# Patient Record
Sex: FEMALE | Race: WHITE | Employment: FULL TIME | URBAN - METROPOLITAN AREA
[De-identification: names, ages, dates, MRNs, and addresses within clinical notes are randomized per-mention and may not be internally consistent; named-entity substitution may affect disease eponyms.]

---

## 2017-06-28 ENCOUNTER — OFFICE VISIT (OUTPATIENT)
Dept: OBGYN | Facility: CLINIC | Age: 22
End: 2017-06-28
Payer: COMMERCIAL

## 2017-06-28 VITALS
DIASTOLIC BLOOD PRESSURE: 72 MMHG | SYSTOLIC BLOOD PRESSURE: 122 MMHG | HEIGHT: 66 IN | WEIGHT: 142 LBS | BODY MASS INDEX: 22.82 KG/M2

## 2017-06-28 DIAGNOSIS — Z30.41 USES ORAL CONTRACEPTION: ICD-10-CM

## 2017-06-28 DIAGNOSIS — Z11.3 SCREEN FOR STD (SEXUALLY TRANSMITTED DISEASE): ICD-10-CM

## 2017-06-28 DIAGNOSIS — F41.9 ANXIETY: ICD-10-CM

## 2017-06-28 DIAGNOSIS — Z11.8 SCREENING FOR CHLAMYDIAL DISEASE: ICD-10-CM

## 2017-06-28 DIAGNOSIS — Z01.419 ENCOUNTER FOR GYNECOLOGICAL EXAMINATION WITHOUT ABNORMAL FINDING: Primary | ICD-10-CM

## 2017-06-28 PROCEDURE — 87591 N.GONORRHOEAE DNA AMP PROB: CPT | Performed by: OBSTETRICS & GYNECOLOGY

## 2017-06-28 PROCEDURE — 99395 PREV VISIT EST AGE 18-39: CPT | Performed by: OBSTETRICS & GYNECOLOGY

## 2017-06-28 PROCEDURE — G0145 SCR C/V CYTO,THINLAYER,RESCR: HCPCS | Performed by: OBSTETRICS & GYNECOLOGY

## 2017-06-28 PROCEDURE — 87491 CHLMYD TRACH DNA AMP PROBE: CPT | Performed by: OBSTETRICS & GYNECOLOGY

## 2017-06-28 ASSESSMENT — ANXIETY QUESTIONNAIRES
1. FEELING NERVOUS, ANXIOUS, OR ON EDGE: SEVERAL DAYS
3. WORRYING TOO MUCH ABOUT DIFFERENT THINGS: MORE THAN HALF THE DAYS
2. NOT BEING ABLE TO STOP OR CONTROL WORRYING: NOT AT ALL
5. BEING SO RESTLESS THAT IT IS HARD TO SIT STILL: NOT AT ALL
GAD7 TOTAL SCORE: 4
IF YOU CHECKED OFF ANY PROBLEMS ON THIS QUESTIONNAIRE, HOW DIFFICULT HAVE THESE PROBLEMS MADE IT FOR YOU TO DO YOUR WORK, TAKE CARE OF THINGS AT HOME, OR GET ALONG WITH OTHER PEOPLE: SOMEWHAT DIFFICULT
6. BECOMING EASILY ANNOYED OR IRRITABLE: SEVERAL DAYS
7. FEELING AFRAID AS IF SOMETHING AWFUL MIGHT HAPPEN: NOT AT ALL

## 2017-06-28 ASSESSMENT — PATIENT HEALTH QUESTIONNAIRE - PHQ9: 5. POOR APPETITE OR OVEREATING: NOT AT ALL

## 2017-06-28 NOTE — MR AVS SNAPSHOT
"              After Visit Summary   6/28/2017    Elena Meehan    MRN: [id number]           Patient Information     Date Of Birth          1995        Visit Information        Provider Department      6/28/2017 4:00 PM Ewa Rubin MD [facility]        Today's Diagnoses     Encounter for gynecological examination without abnormal finding    -  1    Uses oral contraception        Anxiety        Screen for STD (sexually transmitted disease)        Screening for chlamydial disease           Follow-ups after your visit        Who to contact     If you have questions or need follow up information about today's clinic visit or your schedule please contact [facility] directly at [phone].  Normal or non-critical lab and imaging results will be communicated to you by MyChart, letter or phone within 4 business days after the clinic has received the results. If you do not hear from us within 7 days, please contact the clinic through MyChart or phone. If you have a critical or abnormal lab result, we will notify you by phone as soon as possible.  Submit refill requests through [facility] or call your pharmacy and they will forward the refill request to us. Please allow 3 business days for your refill to be completed.          Additional Information About Your Visit        MyChart Information     [facility] lets you send messages to your doctor, view your test results, renew your prescriptions, schedule appointments and more. To sign up, go to [email or web address] . Click on \"Log in\" on the left side of the screen, which will take you to the Welcome page. Then click on \"Sign up Now\" on the right side of the page.     You will be asked to enter the access code listed below, as well as some personal information. Please follow the directions to create your username and password.     Your access code is: [email or web address]  Expires: 9/28/2017  8:23 AM     Your access code will " " in 90 days. If you need help or a new code, please call your [place] clinic or [phone].        Care EveryWhere ID     This is your Care EveryWhere ID. This could be used by other organizations to access your [place] medical records  [id number]        Your Vitals Were     Height Last Period BMI (Body Mass Index)             5' 5.5\" (1.664 m) 2017 23.27 kg/m2          Blood Pressure from Last 3 Encounters:   17 122/72   16 112/70   01/21/15 110/76    Weight from Last 3 Encounters:   17 142 lb (64.4 kg)   16 140 lb (63.5 kg)   01/21/15 138 lb (62.6 kg) (69 %)*     * Growth percentiles are based on [facility] 2-20 Years data.              We Performed the Following     Chlamydia trachomatis PCR     Neisseria gonorrhoeae PCR     Pap imaged thin layer screen only - recommended age 21 - 24 years          Today's Medication Changes          These changes are accurate as of: 17 11:59 PM.  If you have any questions, ask your nurse or doctor.               These medicines have changed or have updated prescriptions.        Dose/Directions    norgestrel-ethinyl estradiol 0.3-30 MG-MCG per tablet   Commonly known as:  LOW-OGESTREL   This may have changed:  See the new instructions.   Used for:  Uses oral contraception   Changed by:  Ewa Rubin MD        Dose:  1 tablet   Take 1 tablet by mouth daily   Quantity:  84 tablet   Refills:  4            Where to get your medicines      These medications were sent to [id number] [address] [address] [place] [address]  [address] [address] [address]     Phone:  [phone]     norgestrel-ethinyl estradiol 0.3-30 MG-MCG per tablet                Primary Care Provider Office Phone # Fax #    Tere Hester PA-C [phone]       [facility] [address] [facility] [address]  [facility] [address]        Equal Access to Services     CROW DUCKWORTH AH: Hadii will Mahmood, gabyda brittany, qaybta zaheer " marciano sneedliudmilasheldon la'aachelsey ah. Shilpa [facility] [phone].    ATENCIÓN: Si habla abhishek, tiene a hernandez disposición servicios gratuitos de asistencia lingüística. Lisy al [phone].    We comply with applicable federal civil rights laws and [place] laws. We do not discriminate on the basis of race, color, national origin, age, disability sex, sexual orientation or gender identity.            Thank you!     Thank you for choosing [facility] [place]  for your care. Our goal is always to provide you with excellent care. Hearing back from our patients is one way we can continue to improve our services. Please take a few minutes to complete the written survey that you may receive in the mail after your visit with us. Thank you!             Your Updated Medication List - Protect others around you: Learn how to safely use, store and throw away your medicines at www.disposemymeds.org.          This list is accurate as of: 6/28/17 11:59 PM.  Always use your most recent med list.                   Brand Name Dispense Instructions for use Diagnosis    norgestrel-ethinyl estradiol 0.3-30 MG-MCG per tablet    LOW-OGESTREL    84 tablet    Take 1 tablet by mouth daily    Uses oral contraception

## 2017-06-28 NOTE — LETTER
July 11, 2017      Elena Meehan  [address]  [place] [address]    Dear ,      I am happy to inform you that your recent cervical cancer screening test (PAP smear) was normal.      Preventative screenings such as this help to ensure your health for years to come. You should repeat a pap smear in 3 years, unless otherwise directed.      You will still need to return to the clinic every year for your annual exam and other preventive tests.     Please contact the clinic at [phone] if you have further questions.       Sincerely,      Ewa Rubin MD/[facility]

## 2017-06-28 NOTE — PROGRESS NOTES
Elena is a 21 year old  female who presents for annual exam.     Besides routine health maintenance, she has no other health concerns today .    HPI:  Patient used to see Tere so first time seeing me today.  Patient is healthy and active. Exercises and tries to eat healthy. Student at [employer]. Will be in her last year next year. Is a supply chain major.  Studied abroad in [place] early this year. Broke up with her BF of three years for a couple months and now back together. Both were s.a with other people during that time. She was s.a with 2 people, not sure about him. Has had GC/C done in the past but never full STD testing. Has a needle phobia and so really doesn't want to do that. Would like to have her cholesterol and thyroid tested at some point so maybe will try to plan a morning appointment and get all of the testing done together and ask her mom to drive her and help her through it.  ocps are working great. No issues, regular and light periods.  Asked about orgasm b/c is only able to do so if on top. Wondered if that is related to a uterus tilt issue  Patient has been struggling with anxiety. Has always been anxious and is sure that tests and school and BF issues earlier in the year definitely made it worse. Feels like she'd be open to trying medicine b/c knows it helps friends that take it. Her parents want her to hold off and just try yoga or some other stress relief.  The patient does not have a primary care provider.      GYNECOLOGIC HISTORY:    Patient's last menstrual period was 2017.  Her current contraception method is: oral contraceptives.  She  reports that she has never smoked. She does not have any smokeless tobacco history on file.  Patient is sexually active.  STD testing offered?  Accepted    Last PHQ-9 score on record =   PHQ-9 SCORE 2017   Total Score 3     Last GAD7 score on record =   LARRY-7 SCORE 2017   Total Score 4     Alcohol Score = 3    HEALTH  "MAINTENANCE:  Cholesterol: no previous history  Last Mammo: N/A, Result: not applicable  Pap: due for first pap today  Colonoscopy:  N/A, Result: not applicable  Dexa:  N/A  Health maintenance updated:  yes    HISTORY:  Obstetric History       T0      L0     SAB0   TAB0   Ectopic0   Multiple0   Live Births0           Patient Active Problem List   Diagnosis     Uses oral contraception     Anxiety     Past Surgical History:   Procedure Laterality Date     NO HISTORY OF SURGERY        Social History   Substance Use Topics     Smoking status: Never Smoker     Smokeless tobacco: Not on file     Alcohol use Not on file      Problem (# of Occurrences) Relation (Name,Age of Onset)    Breast Cancer (2) Maternal Grandmother, Paternal Grandmother    Coronary Artery Disease (1) Maternal Grandfather    DIABETES (1) Paternal Grandmother    Hyperlipidemia (6) Maternal Grandmother, Paternal Grandmother, Father, Brother, Sister, Paternal Grandfather            Current Outpatient Prescriptions   Medication Sig     norgestrel-ethinyl estradiol (LOW-OGESTREL) 0.3-30 MG-MCG per tablet Take 1 tablet by mouth daily     No current facility-administered medications for this visit.      Allergies   Allergen Reactions     Zithromax [Azithromycin]        Past medical, surgical, social and family histories were reviewed and updated in EPIC.    ROS:   12 point review of systems negative other than symptoms noted below.  Constitutional: Fatigue  Head: Nasal Congestion  Skin: Acne  Psychiatric: Awad    EXAM:  /72  Ht 5' 5.5\" (1.664 m)  Wt 142 lb (64.4 kg)  LMP 2017  BMI 23.27 kg/m2   BMI: Body mass index is 23.27 kg/(m^2).    PHYSICAL EXAM:  Constitutional:  Appearance: Well nourished, well developed, alert, in no acute distress  Neck:  Lymph Nodes:  No lymphadenopathy present    Thyroid:  Gland size normal, nontender, no nodules or masses present  on palpation  Chest:  Respiratory Effort:  Breathing " unlabored  Cardiovascular:    Heart: Auscultation:  Regular rate, normal rhythm, no murmurs present  Breasts: Inspection of Breasts:  No lymphadenopathy present    Palpation of Breasts and Axillae:  No masses present on palpation, no  breast tenderness    Axillary Lymph Nodes:  No lymphadenopathy present  Gastrointestinal:   Abdominal Examination:  Abdomen nontender to palpation, tone normal without rigidity or guarding, no masses present, umbilicus without lesions   Liver and Spleen:  No hepatomegaly present, liver nontender to palpation    Hernias:  No hernias present  Lymphatic: Lymph Nodes:  No other lymphadenopathy present  Skin:  General Inspection:  No rashes present, no lesions present, no areas of  discoloration    Genitalia and Groin:  No rashes present, no lesions present, no areas of  discoloration, no masses present  Neurologic/Psychiatric:    Mental Status:  Oriented X3     Pelvic Exam:  External Genitalia:     Normal appearance for age, no discharge present, no tenderness present, no inflammatory lesions present, color normal  Vagina:     Normal vaginal vault without central or paravaginal defects, no discharge present, no inflammatory lesions present, no masses present  Bladder:     Nontender to palpation  Urethra:   Urethral Body:  Urethra palpation normal, urethra structural support normal   Urethral Meatus:  No erythema or lesions present  Cervix:     Appearance healthy, no lesions present, nontender to palpation, no bleeding present  Uterus:     Uterus: firm, normal sized and nontender, anteverted in position.   Adnexa:     No adnexal tenderness present, no adnexal masses present  Perineum:     Perineum within normal limits, no evidence of trauma, no rashes or skin lesions present  Anus:     Anus within normal limits, no hemorrhoids present  Inguinal Lymph Nodes:     No lymphadenopathy present  Pubic Hair:     Normal pubic hair distribution for age  Genitalia and Groin:     No rashes present, no  lesions present, no areas of discoloration, no masses present    COUNSELING:   Reviewed preventive health counseling, as reflected in patient instructions  Special attention given to:        anxiety       Contraception       Safe sex practices/STD prevention    BMI: Body mass index is 23.27 kg/(m^2).      ASSESSMENT:  21 year old female with satisfactory annual exam.    ICD-10-CM    1. Encounter for gynecological examination without abnormal finding Z01.419 Pap imaged thin layer screen only - recommended age 21 - 24 years   2. Uses oral contraception Z30.41 norgestrel-ethinyl estradiol (LOW-OGESTREL) 0.3-30 MG-MCG per tablet   3. Anxiety F41.9    4. Screen for STD (sexually transmitted disease) Z11.3 Neisseria gonorrhoeae PCR   5. Screening for chlamydial disease Z11.8 Chlamydia trachomatis PCR       PLAN:  Pap done today and no cotesting due to age  GC/C done today but declined other testing. Recommend that she do full panel at some point as never has.  Patient would like to try to come back some morning and do fasting lipids, cmp, tsh with reflex and then do HIV, HSV-1&2 and syphilis all on the same day  Refill ocps   dsicussed sexual positions and normal for women to have what she's experiencing.  Discussed anxiety, therapy, yoga, meditation, breathing techniques and sSRI therapy. Will think about it some and before going back to school in 2 months, if thinks she would like to try SSRI could rx something by phone and then have her f/u when she comes back to town    Ewa Rubin MD

## 2017-06-29 ASSESSMENT — PATIENT HEALTH QUESTIONNAIRE - PHQ9: SUM OF ALL RESPONSES TO PHQ QUESTIONS 1-9: 3

## 2017-06-29 ASSESSMENT — ANXIETY QUESTIONNAIRES: GAD7 TOTAL SCORE: 4

## 2017-06-30 PROBLEM — F41.9 ANXIETY: Status: ACTIVE | Noted: 2017-06-30

## 2017-06-30 PROBLEM — Z30.41 USES ORAL CONTRACEPTION: Status: ACTIVE | Noted: 2017-06-30

## 2017-06-30 LAB
C TRACH DNA SPEC QL NAA+PROBE: NORMAL
N GONORRHOEA DNA SPEC QL NAA+PROBE: NORMAL
SPECIMEN SOURCE: NORMAL
SPECIMEN SOURCE: NORMAL

## 2017-07-03 ENCOUNTER — TELEPHONE (OUTPATIENT)
Dept: NURSING | Facility: CLINIC | Age: 22
End: 2017-07-03

## 2017-07-05 ENCOUNTER — TELEPHONE (OUTPATIENT)
Dept: OBGYN | Facility: CLINIC | Age: 22
End: 2017-07-05

## 2017-07-05 LAB
COPATH REPORT: NORMAL
PAP: NORMAL

## 2017-07-05 NOTE — TELEPHONE ENCOUNTER
Patient calling to get more detailed information about lab results   She is aware we dont do pap smear results here in the clinic  [phone] (home)

## 2017-08-21 ENCOUNTER — TELEPHONE (OUTPATIENT)
Dept: OBGYN | Facility: CLINIC | Age: 22
End: 2017-08-21

## 2017-08-21 NOTE — TELEPHONE ENCOUNTER
Needs birth control - needs to be picked up tomorrow 8/22  Leaving [facility]  Pharmacy - [facility]

## 2017-08-21 NOTE — TELEPHONE ENCOUNTER
LOW-OGESTREL       Last Written Prescription Date:  6/28/17  Last Fill Quantity: 84,   # refills: 4  Last Office Visit with [facility] primary care provider:  6/28/17  Future Office visit: none    Informed pt year supply sent at last visit. Pt unaware and stated understanding., Closing encounter.

## 2017-09-15 ENCOUNTER — TELEPHONE (OUTPATIENT)
Dept: NURSING | Facility: CLINIC | Age: 22
End: 2017-09-15

## 2017-09-15 NOTE — TELEPHONE ENCOUNTER
Pt calling stating her Rx (OCP) is currently at [facility] in [place] and pt would like it transferred to a [facility] in [facility] where she is going to school. Informed to call the [facility] and indicate what pharmacy and note the phone number that you want all your refills transferred to and they will verbally transfer it from Pharmacist to Pharmacist, that way to ensure no refills get lost. Pt verbalized understanding and has no further questions.

## 2018-03-29 ENCOUNTER — TELEPHONE (OUTPATIENT)
Dept: NURSING | Facility: CLINIC | Age: 23
End: 2018-03-29

## 2018-03-29 NOTE — TELEPHONE ENCOUNTER
Last night pt took additional OCP at about 11:30 pm. Usually takes in the morning at 8:00 am. Pt wanted to know if she should take another OCP this morning. Informed no go back to normal cycle tomorrow. Pt concerned about becoming pregnant. Pt informed could use condom for the remainder of the pack to be sure. Pt will have cycle in 10 days.

## 2018-05-30 ENCOUNTER — OFFICE VISIT (OUTPATIENT)
Dept: OBGYN | Facility: CLINIC | Age: 23
End: 2018-05-30
Payer: COMMERCIAL

## 2018-05-30 VITALS
DIASTOLIC BLOOD PRESSURE: 76 MMHG | HEIGHT: 66 IN | SYSTOLIC BLOOD PRESSURE: 116 MMHG | BODY MASS INDEX: 22.82 KG/M2 | HEART RATE: 72 BPM | WEIGHT: 142 LBS

## 2018-05-30 DIAGNOSIS — R53.83 FATIGUE, UNSPECIFIED TYPE: ICD-10-CM

## 2018-05-30 DIAGNOSIS — Z30.41 USES ORAL CONTRACEPTION: ICD-10-CM

## 2018-05-30 DIAGNOSIS — Z01.419 ENCOUNTER FOR GYNECOLOGICAL EXAMINATION WITHOUT ABNORMAL FINDING: Primary | ICD-10-CM

## 2018-05-30 DIAGNOSIS — Z11.3 SCREEN FOR SEXUALLY TRANSMITTED DISEASES: ICD-10-CM

## 2018-05-30 DIAGNOSIS — R19.7 DIARRHEA, UNSPECIFIED TYPE: ICD-10-CM

## 2018-05-30 PROCEDURE — 87389 HIV-1 AG W/HIV-1&-2 AB AG IA: CPT | Performed by: NURSE PRACTITIONER

## 2018-05-30 PROCEDURE — 86696 HERPES SIMPLEX TYPE 2 TEST: CPT | Performed by: NURSE PRACTITIONER

## 2018-05-30 PROCEDURE — 99395 PREV VISIT EST AGE 18-39: CPT | Performed by: NURSE PRACTITIONER

## 2018-05-30 PROCEDURE — 36415 COLL VENOUS BLD VENIPUNCTURE: CPT | Performed by: NURSE PRACTITIONER

## 2018-05-30 PROCEDURE — 83516 IMMUNOASSAY NONANTIBODY: CPT | Performed by: NURSE PRACTITIONER

## 2018-05-30 PROCEDURE — 84443 ASSAY THYROID STIM HORMONE: CPT | Performed by: NURSE PRACTITIONER

## 2018-05-30 PROCEDURE — 86780 TREPONEMA PALLIDUM: CPT | Performed by: NURSE PRACTITIONER

## 2018-05-30 PROCEDURE — 86695 HERPES SIMPLEX TYPE 1 TEST: CPT | Performed by: NURSE PRACTITIONER

## 2018-05-30 ASSESSMENT — PATIENT HEALTH QUESTIONNAIRE - PHQ9: 5. POOR APPETITE OR OVEREATING: SEVERAL DAYS

## 2018-05-30 ASSESSMENT — ANXIETY QUESTIONNAIRES
GAD7 TOTAL SCORE: 8
IF YOU CHECKED OFF ANY PROBLEMS ON THIS QUESTIONNAIRE, HOW DIFFICULT HAVE THESE PROBLEMS MADE IT FOR YOU TO DO YOUR WORK, TAKE CARE OF THINGS AT HOME, OR GET ALONG WITH OTHER PEOPLE: SOMEWHAT DIFFICULT
1. FEELING NERVOUS, ANXIOUS, OR ON EDGE: MORE THAN HALF THE DAYS
7. FEELING AFRAID AS IF SOMETHING AWFUL MIGHT HAPPEN: NOT AT ALL
3. WORRYING TOO MUCH ABOUT DIFFERENT THINGS: MORE THAN HALF THE DAYS
2. NOT BEING ABLE TO STOP OR CONTROL WORRYING: SEVERAL DAYS
5. BEING SO RESTLESS THAT IT IS HARD TO SIT STILL: SEVERAL DAYS
6. BECOMING EASILY ANNOYED OR IRRITABLE: SEVERAL DAYS

## 2018-05-30 NOTE — PROGRESS NOTES
"  Elena is a 22 year old  female who presents for annual exam.     Besides routine health maintenance, would like to have gluten allergy testing.    HPI:  The patient does not use a PCP.  Pt here today for her annual exam. She is feeling okay. She typically sees Dr. Rubin, but she graduated last weekend and is moving to [place] tomorrow for a new job.     She has been plagued with diarrhea since October. She is thinking possibly gluten allergy. She is requesting allergy testing today. She has also noticed spots of blood rectally. She moves her bowels multiple times per day, then alternates with constipation. This sounds more like possible hemorrhoids/anal fissures.     OCP's are going well. No concerns. No VTE, BTB. No headaches. Menses are regular and light. Minimal cramping.     She is sexually active with one male partner. They broke up for \"5 minutes\" a year ago. She had CT/GC testing last year. She declines this today. She would however like to be tested for \"everything else\" today.     She is fatigued, and feels \"off\". We discussed that she has been on a whirlwind the last few months with graduating/moving/job/stress that this can be the body's normal response.     Pt seems very hypersensitive to the fact that she \"may\" have Hashimoto's disease, Celiac disease and is wondering what the signs are of colon cancer.     Pap smear is up to date for guidelines. Last pap was 2017. NIL.      GYNECOLOGIC HISTORY:    Patient's last menstrual period was 2018.  Her current contraception method is: oral contraceptives.  She  reports that she has never smoked. She has never used smokeless tobacco.    Patient is sexually active.  STD testing offered?  would like to discuss with provider  Last PHQ-9 score on record =   PHQ-9 SCORE 2018   Total Score 2     Last GAD7 score on record =   LARRY-7 SCORE 2018   Total Score 8     Alcohol Score = 4    HEALTH MAINTENANCE:  Cholesterol: unsure when  Last " "Mammo: never, Result: not applicable, Next Mammo:  Age 40  Pap: (  Lab Results   Component Value Date    PAP NIL 2017      Colonoscopy:  never, Result: not applicable, Next Colonoscopy: age 50.  Dexa:  never    Health maintenance updated:  yes    HISTORY:  Obstetric History       T0      L0     SAB0   TAB0   Ectopic0   Multiple0   Live Births0           Patient Active Problem List   Diagnosis     Uses oral contraception     Anxiety     Past Surgical History:   Procedure Laterality Date     NO HISTORY OF SURGERY        Social History   Substance Use Topics     Smoking status: Never Smoker     Smokeless tobacco: Never Used     Alcohol use Not on file      Problem (# of Occurrences) Relation (Name,Age of Onset)    Breast Cancer (2) Maternal Grandmother, Paternal Grandmother    Coronary Artery Disease (1) Maternal Grandfather    DIABETES (1) Paternal Grandmother    Hyperlipidemia (6) Maternal Grandmother, Paternal Grandmother, Father, Brother, Sister, Paternal Grandfather            Current Outpatient Prescriptions   Medication Sig     norgestrel-ethinyl estradiol (LOW-OGESTREL) 0.3-30 MG-MCG per tablet Take 1 tablet by mouth daily     [DISCONTINUED] norgestrel-ethinyl estradiol (LOW-OGESTREL) 0.3-30 MG-MCG per tablet Take 1 tablet by mouth daily     No current facility-administered medications for this visit.      Allergies   Allergen Reactions     Zithromax [Azithromycin]        Past medical, surgical, social and family histories were reviewed and updated in EPIC.    ROS:   12 point review of systems negative other than symptoms noted below.  Constitutional: Fatigue  Gastrointestinal: Blood in Stools and Diarrhea  Skin: Acne  Neurologic: Headaches  Psychiatric: Anxiety    EXAM:  /76  Pulse 72  Ht 5' 5.5\" (1.664 m)  Wt 142 lb (64.4 kg)  LMP 2018  BMI 23.27 kg/m2   BMI: Body mass index is 23.27 kg/(m^2).    PHYSICAL EXAM:  Constitutional:  Appearance: Well nourished, well developed, " alert, in no acute distress  Neck:  Lymph Nodes:  No lymphadenopathy present    Thyroid:  Gland size normal, nontender, no nodules or masses present  on palpation  Chest:  Respiratory Effort:  Breathing unlabored  Cardiovascular:    Heart: Auscultation:  Regular rate, normal rhythm, no murmurs present  Breasts: Inspection of Breasts:  No lymphadenopathy present., Palpation of Breasts and Axillae:  No masses present on palpation, no breast tenderness., Axillary Lymph Nodes:  No lymphadenopathy present. and No nodularity, asymmetry or nipple discharge bilaterally.  Gastrointestinal:   Abdominal Examination:  Abdomen nontender to palpation, tone normal without rigidity or guarding, no masses present, umbilicus without lesions   Liver and Spleen:  No hepatomegaly present, liver nontender to palpation    Hernias:  No hernias present  Lymphatic: Lymph Nodes:  No other lymphadenopathy present  Skin:  General Inspection:  No rashes present, no lesions present, no areas of  discoloration    Genitalia and Groin:  No rashes present, no lesions present, no areas of  discoloration, no masses present  Neurologic/Psychiatric:    Mental Status:  Oriented X3     Pelvic Exam:  External Genitalia:     Normal appearance for age, no discharge present, no tenderness present, no inflammatory lesions present, color normal  Vagina:     Normal vaginal vault without central or paravaginal defects, no discharge present, no inflammatory lesions present, no masses present  Bladder:     Nontender to palpation  Urethra:   Urethral Body:  Urethra palpation normal, urethra structural support normal   Urethral Meatus:  No erythema or lesions present  Cervix:     Appearance healthy, no lesions present, nontender to palpation, no bleeding present  Uterus:     Uterus: firm, normal sized and nontender, retroverted in position.   Adnexa:     No adnexal tenderness present, no adnexal masses present  Perineum:     Perineum within normal limits, no evidence  of trauma, no rashes or skin lesions present  Anus:     Anus within normal limits, no hemorrhoids present  Inguinal Lymph Nodes:     No lymphadenopathy present  Pubic Hair:     Normal pubic hair distribution for age  Genitalia and Groin:     No rashes present, no lesions present, no areas of discoloration, no masses present      COUNSELING:   Special attention given to:        Regular exercise       Healthy diet/nutrition       Contraception       Safe sex practices/STD prevention       HIV screeninx in teen years, 1x in adult years, and at intervals if high risk    BMI: Body mass index is 23.27 kg/(m^2).      ASSESSMENT:  22 year old female with satisfactory annual exam.    ICD-10-CM    1. Encounter for gynecological examination without abnormal finding Z01.419    2. Uses oral contraception Z30.41 norgestrel-ethinyl estradiol (LOW-OGESTREL) 0.3-30 MG-MCG per tablet   3. Fatigue, unspecified type R53.83 TSH with free T4 reflex   4. Diarrhea, unspecified type R19.7 Tissue transglutaminase kathia IgA and IgG   5. Screen for sexually transmitted diseases Z11.3 Treponema Abs w Reflex to RPR and Titer     Herpes Simplex Virus 1 and 2 IgG     HIV Antigen Antibody Combo       PLAN:  Healthy, normal gyn exam. Ok to continue OCP's x1 year. Will check labs today.     LEVAR Snyder CNP

## 2018-05-30 NOTE — MR AVS SNAPSHOT
"              After Visit Summary   5/30/2018    Elena Meehan    MRN: [id number]           Patient Information     Date Of Birth          1995        Visit Information        Provider Department      5/30/2018 2:00 PM Kirti Parker APRN CNP [facility]        Today's Diagnoses     Encounter for gynecological examination without abnormal finding    -  1    Uses oral contraception        Fatigue, unspecified type        Diarrhea, unspecified type        Screen for sexually transmitted diseases           Follow-ups after your visit        Who to contact     If you have questions or need follow up information about today's clinic visit or your schedule please contact [facility][1] directly at [phone].  Normal or non-critical lab and imaging results will be communicated to you by MyChart, letter or phone within 4 business days after the clinic has received the results. If you do not hear from us within 7 days, please contact the clinic through MyChart or phone. If you have a critical or abnormal lab result, we will notify you by phone as soon as possible.  Submit refill requests through [employer] or call your pharmacy and they will forward the refill request to us. Please allow 3 business days for your refill to be completed.          Additional Information About Your Visit        Care EveryWhere ID     This is your Care EveryWhere ID. This could be used by other organizations to access your [place] medical records  [id number]        Your Vitals Were     Pulse Height Last Period BMI (Body Mass Index)          72 5' 5.5\" (1.664 m) 05/08/2018 23.27 kg/m2         Blood Pressure from Last 3 Encounters:   05/30/18 116/76   06/28/17 122/72   01/14/16 112/70    Weight from Last 3 Encounters:   05/30/18 142 lb (64.4 kg)   06/28/17 142 lb (64.4 kg)   01/14/16 140 lb (63.5 kg)              We Performed the Following     Herpes Simplex Virus 1 and 2 IgG     HIV Antigen " Antibody Combo     Tissue transglutaminase kathia IgA and IgG     Treponema Abs w Reflex to RPR and Titer     TSH with free T4 reflex          Where to get your medicines      These medications were sent to [id number] [place] - [address] AT [facility] [address]  [address], [place] [address]     Phone:  [phone]     norgestrel-ethinyl estradiol 0.3-30 MG-MCG per tablet          Primary Care Provider    None Specified       No primary provider on file.        Equal Access to Services     [facility]: Hadii aad jazzmine hadasho Soomaali, waaxda luqadaha, qaybta kaalmada adeegyachristoph, marciano warren . So [facility] [phone].    ATENCIÓN: Si habla español, tiene a hernandez disposición servicios gratuitos de asistencia lingüística. [facility] [phone].    We comply with applicable federal civil rights laws and [place] laws. We do not discriminate on the basis of race, color, national origin, age, disability, sex, sexual orientation, or gender identity.            Thank you!     Thank you for choosing [facility]  for your care. Our goal is always to provide you with excellent care. Hearing back from our patients is one way we can continue to improve our services. Please take a few minutes to complete the written survey that you may receive in the mail after your visit with us. Thank you!             Your Updated Medication List - Protect others around you: Learn how to safely use, store and throw away your medicines at www.disposemymeds.org.          This list is accurate as of 5/30/18  2:57 PM.  Always use your most recent med list.                   Brand Name Dispense Instructions for use Diagnosis    norgestrel-ethinyl estradiol 0.3-30 MG-MCG per tablet    LOW-OGESTREL    84 tablet    Take 1 tablet by mouth daily    Uses oral contraception

## 2018-05-31 LAB
HIV 1+2 AB+HIV1 P24 AG SERPL QL IA: NONREACTIVE
TSH SERPL DL<=0.005 MIU/L-ACNC: 1.52 MU/L (ref 0.4–4)

## 2018-05-31 ASSESSMENT — PATIENT HEALTH QUESTIONNAIRE - PHQ9: SUM OF ALL RESPONSES TO PHQ QUESTIONS 1-9: 2

## 2018-05-31 ASSESSMENT — ANXIETY QUESTIONNAIRES: GAD7 TOTAL SCORE: 8

## 2018-06-01 ENCOUNTER — TELEPHONE (OUTPATIENT)
Dept: OBGYN | Facility: CLINIC | Age: 23
End: 2018-06-01

## 2018-06-01 LAB
HSV1 IGG SERPL QL IA: <0.2 AI (ref 0–0.8)
HSV2 IGG SERPL QL IA: <0.2 AI (ref 0–0.8)
T PALLIDUM AB SER QL: NONREACTIVE
TTG IGA SER-ACNC: <1 U/ML
TTG IGG SER-ACNC: 1 U/ML

## 2018-06-04 NOTE — TELEPHONE ENCOUNTER
Pt calling as she missed 's call last week regarding lab results. Reviewed lab results she had 5/30/18 with pt as in chart. Reported to pt that  reviewed results, but no message regarding further advice. Results appear normal. Pt going out of town for 2 weeks for work. Appreciative of results. Reassured pt if  had further advice she would call her back.  Verbalized understanding and no further questions.  Kirti MA RN

## 2018-09-05 ENCOUNTER — TELEPHONE (OUTPATIENT)
Dept: OBGYN | Facility: CLINIC | Age: 23
End: 2018-09-05

## 2018-09-05 DIAGNOSIS — Z30.012 EMERGENCY CONTRACEPTION: Primary | ICD-10-CM

## 2018-09-05 NOTE — TELEPHONE ENCOUNTER
Pt on OCP and her boy friend was in town, they had IC over the weekend but at the same time she threw up her OCP 2 days in a row from intoxication.  In the first week of pills.  Recommended second form of contraception for the next week. Consulted SM and order received for Juanita emergency contraception and will have to wait until she gets her period with the 4th week of pills as usual.    Pt had to go to a conference call at work and asked we call back and leave a detailed vm which I did.    Offered the emergency contraception pill Juanita, but will need her to call back with pharmacy of choice. Take that once and expect se's of breast tenderness, bleeding and cramping. Continue with OCP where she is on her pack and wait for a period as usual. Let SM know if no period.  Encouraged pt to call with her plan and pharmacy if she wishes for the Juanita Rx.  Juanita Rx pended.

## 2018-09-05 NOTE — TELEPHONE ENCOUNTER
Pt called back- reviewed the note below.-   Pharmacy- [facility]  In [facility]

## 2019-03-29 ENCOUNTER — OFFICE VISIT (OUTPATIENT)
Dept: OBGYN | Facility: CLINIC | Age: 24
End: 2019-03-29
Payer: COMMERCIAL

## 2019-03-29 VITALS
WEIGHT: 139.4 LBS | HEIGHT: 66 IN | BODY MASS INDEX: 22.4 KG/M2 | DIASTOLIC BLOOD PRESSURE: 70 MMHG | SYSTOLIC BLOOD PRESSURE: 110 MMHG | HEART RATE: 72 BPM

## 2019-03-29 DIAGNOSIS — Z13.6 ENCOUNTER FOR LIPID SCREENING FOR CARDIOVASCULAR DISEASE: ICD-10-CM

## 2019-03-29 DIAGNOSIS — Z11.3 SCREEN FOR STD (SEXUALLY TRANSMITTED DISEASE): ICD-10-CM

## 2019-03-29 DIAGNOSIS — Z13.220 ENCOUNTER FOR LIPID SCREENING FOR CARDIOVASCULAR DISEASE: ICD-10-CM

## 2019-03-29 DIAGNOSIS — Z01.419 ENCOUNTER FOR GYNECOLOGICAL EXAMINATION WITHOUT ABNORMAL FINDING: Primary | ICD-10-CM

## 2019-03-29 DIAGNOSIS — Z13.1 SCREENING FOR DIABETES MELLITUS: ICD-10-CM

## 2019-03-29 DIAGNOSIS — Z11.8 SCREENING FOR CHLAMYDIAL DISEASE: ICD-10-CM

## 2019-03-29 DIAGNOSIS — Z30.41 USES ORAL CONTRACEPTION: ICD-10-CM

## 2019-03-29 DIAGNOSIS — R53.83 FATIGUE, UNSPECIFIED TYPE: ICD-10-CM

## 2019-03-29 LAB
CHOLEST SERPL-MCNC: 131 MG/DL
FERRITIN SERPL-MCNC: 27 NG/ML (ref 12–150)
GLUCOSE BLD-MCNC: 89 MG/DL (ref 70–99)
HDLC SERPL-MCNC: 72 MG/DL
LDLC SERPL CALC-MCNC: 54 MG/DL
NONHDLC SERPL-MCNC: 59 MG/DL
TRIGL SERPL-MCNC: 37 MG/DL

## 2019-03-29 PROCEDURE — 87389 HIV-1 AG W/HIV-1&-2 AB AG IA: CPT | Performed by: NURSE PRACTITIONER

## 2019-03-29 PROCEDURE — 86695 HERPES SIMPLEX TYPE 1 TEST: CPT | Performed by: NURSE PRACTITIONER

## 2019-03-29 PROCEDURE — 87491 CHLMYD TRACH DNA AMP PROBE: CPT | Performed by: NURSE PRACTITIONER

## 2019-03-29 PROCEDURE — 0064U ANTB TP TOTAL&RPR IA QUAL: CPT | Performed by: NURSE PRACTITIONER

## 2019-03-29 PROCEDURE — 36415 COLL VENOUS BLD VENIPUNCTURE: CPT | Performed by: NURSE PRACTITIONER

## 2019-03-29 PROCEDURE — 82947 ASSAY GLUCOSE BLOOD QUANT: CPT | Performed by: NURSE PRACTITIONER

## 2019-03-29 PROCEDURE — 99395 PREV VISIT EST AGE 18-39: CPT | Performed by: NURSE PRACTITIONER

## 2019-03-29 PROCEDURE — 86696 HERPES SIMPLEX TYPE 2 TEST: CPT | Performed by: NURSE PRACTITIONER

## 2019-03-29 PROCEDURE — 80061 LIPID PANEL: CPT | Performed by: NURSE PRACTITIONER

## 2019-03-29 PROCEDURE — 87591 N.GONORRHOEAE DNA AMP PROB: CPT | Performed by: NURSE PRACTITIONER

## 2019-03-29 PROCEDURE — 82728 ASSAY OF FERRITIN: CPT | Performed by: NURSE PRACTITIONER

## 2019-03-29 ASSESSMENT — PATIENT HEALTH QUESTIONNAIRE - PHQ9
5. POOR APPETITE OR OVEREATING: SEVERAL DAYS
SUM OF ALL RESPONSES TO PHQ QUESTIONS 1-9: 4

## 2019-03-29 ASSESSMENT — ANXIETY QUESTIONNAIRES
1. FEELING NERVOUS, ANXIOUS, OR ON EDGE: MORE THAN HALF THE DAYS
GAD7 TOTAL SCORE: 8
6. BECOMING EASILY ANNOYED OR IRRITABLE: SEVERAL DAYS
5. BEING SO RESTLESS THAT IT IS HARD TO SIT STILL: MORE THAN HALF THE DAYS
7. FEELING AFRAID AS IF SOMETHING AWFUL MIGHT HAPPEN: NOT AT ALL
IF YOU CHECKED OFF ANY PROBLEMS ON THIS QUESTIONNAIRE, HOW DIFFICULT HAVE THESE PROBLEMS MADE IT FOR YOU TO DO YOUR WORK, TAKE CARE OF THINGS AT HOME, OR GET ALONG WITH OTHER PEOPLE: SOMEWHAT DIFFICULT
3. WORRYING TOO MUCH ABOUT DIFFERENT THINGS: SEVERAL DAYS
2. NOT BEING ABLE TO STOP OR CONTROL WORRYING: SEVERAL DAYS

## 2019-03-29 ASSESSMENT — MIFFLIN-ST. JEOR: SCORE: 1396.12

## 2019-03-29 NOTE — PROGRESS NOTES
"  Elena is a 23 year old  female who presents for annual exam.     Besides routine health maintenance, she would like to discuss getting lab work-wants to check iron. She c/o feeling fatigue.    HPI:  No primary care provider on file.  Pt here today for her annual exam.     She is feelign well. Recently dx with new anxiety. Pt knew she had it for a long time, but got a \"formal\" diagnosis to prove it to her mom. She was rx'd sertraline but hasn't taken it because her mother told her to try with out it.     Pt notes she has bloating with OCP. She forgot her pills while in [place] and lost 5 lbs. Likely water weight. Offered to change OCP's. Pt will think about it.     Accepts full STD testing today.     Is fasting and requesting labs as well as iron. She also has questions about BRCA testing. MGM with breast cancer. Mother has no hx. Requesting information even if she has to pay for the testing herself.       GYNECOLOGIC HISTORY:    Patient's last menstrual period was 2019 (exact date).  Her current contraception method is: oral contraceptives.  She  reports that  has never smoked. she has never used smokeless tobacco.    Patient is sexually active.  STD testing offered?  Accepted  Last PHQ-9 score on record =   PHQ-9 SCORE 3/29/2019   PHQ-9 Total Score 4     Last GAD7 score on record =   LARRY-7 SCORE 3/29/2019   Total Score 8     Alcohol Score = 5    HEALTH MAINTENANCE:  Cholesterol: (No results found for: CHOL   Last Mammo: NA, Maternal (60s) and Paternal grandmother diagnosed with breast cancer. Interested in genetic testing  Pap: 17 neg  Colonoscopy: NA, due at age 50  Dexa: Never    Health maintenance updated:  yes    HISTORY:  Obstetric History       T0      L0     SAB0   TAB0   Ectopic0   Multiple0   Live Births0           Patient Active Problem List   Diagnosis     Uses oral contraception     Anxiety     Past Surgical History:   Procedure Laterality Date     NO HISTORY OF " "SURGERY        Social History     Tobacco Use     Smoking status: Never Smoker     Smokeless tobacco: Never Used   Substance Use Topics     Alcohol use: Yes     Alcohol/week: 0.0 oz      Problem (# of Occurrences) Relation (Name,Age of Onset)    Breast Cancer (2) Maternal Grandmother, Paternal Grandmother    Coronary Artery Disease (1) Maternal Grandfather    Diabetes (1) Paternal Grandmother    Hyperlipidemia (6) Maternal Grandmother, Paternal Grandmother, Father, Brother, Sister, Paternal Grandfather            Current Outpatient Medications   Medication Sig     norgestrel-ethinyl estradiol (LOW-OGESTREL) 0.3-30 MG-MCG tablet Take 1 tablet by mouth daily     sertraline (ZOLOFT) 50 MG tablet      No current facility-administered medications for this visit.      Allergies   Allergen Reactions     Zithromax [Azithromycin]        Past medical, surgical, social and family histories were reviewed and updated in EPIC.    ROS:   12 point review of systems negative other than symptoms noted below.  Genitourinary: Night Sweats and Pelvic Pain  Skin: Acne  Psychiatric: Anxiety    EXAM:  /70   Pulse 72   Ht 1.664 m (5' 5.5\")   Wt 63.2 kg (139 lb 6.4 oz)   LMP 03/12/2019 (Exact Date)   BMI 22.84 kg/m     BMI: Body mass index is 22.84 kg/m .    PHYSICAL EXAM:  Constitutional:  Appearance: Well nourished, well developed, alert, in no acute distress  Neck:  Lymph Nodes:  No lymphadenopathy present    Thyroid:  Gland size normal, nontender, no nodules or masses present  on palpation  Chest:  Respiratory Effort:  Breathing unlabored  Cardiovascular:    Heart: Auscultation:  Regular rate, normal rhythm, no murmurs present  Breasts: Inspection of Breasts:  No lymphadenopathy present., Palpation of Breasts and Axillae:  No masses present on palpation, no breast tenderness., Axillary Lymph Nodes:  No lymphadenopathy present. and No nodularity, asymmetry or nipple discharge bilaterally.  Gastrointestinal:   Abdominal " Examination:  Abdomen nontender to palpation, tone normal without rigidity or guarding, no masses present, umbilicus without lesions   Liver and Spleen:  No hepatomegaly present, liver nontender to palpation    Hernias:  No hernias present  Lymphatic: Lymph Nodes:  No other lymphadenopathy present  Skin:  General Inspection:  No rashes present, no lesions present, no areas of  discoloration    Genitalia and Groin:  No rashes present, no lesions present, no areas of  discoloration, no masses present  Neurologic/Psychiatric:    Mental Status:  Oriented X3     Pelvic Exam:  External Genitalia:     Normal appearance for age, no discharge present, no tenderness present, no inflammatory lesions present, color normal  Vagina:     Normal vaginal vault without central or paravaginal defects, no discharge present, no inflammatory lesions present, no masses present  Bladder:     Nontender to palpation  Urethra:   Urethral Body:  Urethra palpation normal, urethra structural support normal   Urethral Meatus:  No erythema or lesions present  Cervix:     Appearance healthy, no lesions present, nontender to palpation, no bleeding present  Uterus:     Uterus: firm, normal sized and nontender, midplane in position.   Adnexa:     No adnexal tenderness present, no adnexal masses present  Perineum:     Perineum within normal limits, no evidence of trauma, no rashes or skin lesions present  Anus:     Anus within normal limits, no hemorrhoids present  Inguinal Lymph Nodes:     No lymphadenopathy present  Pubic Hair:     Normal pubic hair distribution for age  Genitalia and Groin:     No rashes present, no lesions present, no areas of discoloration, no masses present      COUNSELING:   Special attention given to:        Regular exercise       Healthy diet/nutrition       Contraception       Safe sex practices/STD prevention    BMI: Body mass index is 22.84 kg/m .      ASSESSMENT:  23 year old female with satisfactory annual exam.     ICD-10-CM    1. Encounter for gynecological examination without abnormal finding Z01.419 HIV Antigen Antibody Combo   2. Fatigue, unspecified type R53.83 Ferritin   3. Screen for STD (sexually transmitted disease) Z11.3 NEISSERIA GONORRHOEA PCR     Treponema Abs w Reflex to RPR and Titer     Herpes Simplex Virus 1 and 2 IgG   4. Screening for chlamydial disease Z11.8 CHLAMYDIA TRACHOMATIS PCR   5. Encounter for lipid screening for cardiovascular disease Z13.220 Lipid panel reflex to direct LDL Fasting    Z13.6    6. Screening for diabetes mellitus Z13.1 Glucose, whole blood   7. Uses oral contraception Z30.41 norgestrel-ethinyl estradiol (LOW-OGESTREL) 0.3-30 MG-MCG tablet       PLAN:  Normal gyn exam. Full STD testing and fasting labs with iron. Pap due next year. NIL in 2017. OCP x1 year sent. If she desires a dosage change she will call. Written info about sertraline given to patient. She feels much better about this and may try it.     LEVAR Snyder CNP

## 2019-03-30 LAB
HIV 1+2 AB+HIV1 P24 AG SERPL QL IA: NONREACTIVE
HSV1 IGG SERPL QL IA: <0.2 AI (ref 0–0.8)
HSV2 IGG SERPL QL IA: <0.2 AI (ref 0–0.8)
T PALLIDUM AB SER QL: NONREACTIVE

## 2019-03-30 ASSESSMENT — ANXIETY QUESTIONNAIRES: GAD7 TOTAL SCORE: 8

## 2019-03-31 LAB
C TRACH DNA SPEC QL NAA+PROBE: NEGATIVE
N GONORRHOEA DNA SPEC QL NAA+PROBE: NEGATIVE
SPECIMEN SOURCE: NORMAL
SPECIMEN SOURCE: NORMAL

## 2020-04-24 ENCOUNTER — VIRTUAL VISIT (OUTPATIENT)
Dept: OBGYN | Facility: CLINIC | Age: 25
End: 2020-04-24
Payer: COMMERCIAL

## 2020-04-24 DIAGNOSIS — N89.8 ITCHING OF VAGINA: Primary | ICD-10-CM

## 2020-04-24 PROCEDURE — 99212 OFFICE O/P EST SF 10 MIN: CPT | Mod: 95 | Performed by: NURSE PRACTITIONER

## 2020-04-24 RX ORDER — FLUCONAZOLE 150 MG/1
150 TABLET ORAL
Qty: 2 TABLET | Refills: 0 | Status: SHIPPED | OUTPATIENT
Start: 2020-04-24 | End: 2020-04-30

## 2020-04-24 NOTE — PROGRESS NOTES
"Elena Meehan is a 24 year old female who is being evaluated via a billable telephone visit.      The patient has been notified of following:     \"This telephone visit will be conducted via a call between you and your physician/provider. We have found that certain health care needs can be provided without the need for a physical exam.  This service lets us provide the care you need with a short phone conversation.  If a prescription is necessary we can send it directly to your pharmacy.  If lab work is needed we can place an order for that and you can then stop by our lab to have the test done at a later time.    Telephone visits are billed at different rates depending on your insurance coverage. During this emergency period, for some insurers they may be billed the same as an in-person visit.  Please reach out to your insurance provider with any questions.    If during the course of the call the physician/provider feels a telephone visit is not appropriate, you will not be charged for this service.\"    Patient has given verbal consent for Telephone visit?  Yes    How would you like to obtain your AVS? E-Mail (inform patient AVS not encrypted)     23 yo with c/o vaginal itching for a few days. She has not self treated for this episode. +s.a. some dyspareunia pt states from dryness. No fever/chills. No urinary symptoms. Denies odor.     Encouraged warm bath soaks. Avoid IC x1 week.    Phone call duration: 5 minutes    LEVAR Snyder CNP        "

## 2020-05-09 DIAGNOSIS — Z30.41 USES ORAL CONTRACEPTION: ICD-10-CM

## 2020-05-10 RX ORDER — NORGESTREL-ETHINYL ESTRADIOL 0.3-0.03MG
TABLET ORAL
Qty: 84 TABLET | Refills: 0 | Status: SHIPPED | OUTPATIENT
Start: 2020-05-10 | End: 2020-06-17

## 2020-05-11 NOTE — TELEPHONE ENCOUNTER
"Requested Prescriptions   Pending Prescriptions Disp Refills     CRYSELLE-28 0.3-30 MG-MCG tablet [Pharmacy Med Name: CRYSELLE-28 TABLET] 84 tablet 3     Sig: TAKE 1 TABLET BY MOUTH EVERY DAY       Contraceptives Protocol Passed - 5/9/2020  9:03 AM        Passed - Patient is not a current smoker if age is 35 or older        Passed - Recent (12 mo) or future (30 days) visit within the authorizing provider's specialty     Patient has had an office visit with the authorizing provider or a provider within the authorizing providers department within the previous 12 mos or has a future within next 30 days. See \"Patient Info\" tab in inbasket, or \"Choose Columns\" in Meds & Orders section of the refill encounter.              Passed - Medication is active on med list        Passed - No active pregnancy on record        Passed - No positive pregnancy test in past 12 months         Appointment needed for further refills  Megha Lebron RN on 5/10/2020 at 7:48 PM      "

## 2020-06-17 DIAGNOSIS — Z30.41 USES ORAL CONTRACEPTION: ICD-10-CM

## 2020-06-17 RX ORDER — NORGESTREL-ETHINYL ESTRADIOL 0.3-0.03MG
1 TABLET ORAL DAILY
Qty: 84 TABLET | Refills: 0 | Status: SHIPPED | OUTPATIENT
Start: 2020-06-17 | End: 2020-06-29 | Stop reason: ALTCHOICE

## 2020-06-17 NOTE — TELEPHONE ENCOUNTER
"Requested Prescriptions   Pending Prescriptions Disp Refills     norgestrel-ethinyl estradiol (CRYSELLE-28) 0.3-30 MG-MCG tablet 84 tablet 0     Sig: Take 1 tablet by mouth daily       Contraceptives Protocol Passed - 6/17/2020  9:56 AM        Passed - Patient is not a current smoker if age is 35 or older        Passed - Recent (12 mo) or future (30 days) visit within the authorizing provider's specialty     Patient has had an office visit with the authorizing provider or a provider within the authorizing providers department within the previous 12 mos or has a future within next 30 days. See \"Patient Info\" tab in inbasket, or \"Choose Columns\" in Meds & Orders section of the refill encounter.              Passed - Medication is active on med list        Passed - No active pregnancy on record        Passed - No positive pregnancy test in past 12 months           Last Written Prescription Date:  5/10/20  Last Fill Quantity: 84,  # refills: 0   Last office visit: 3/29/2019 with prescribing provider:  Kirti Parker   Future Office Visit:   Next 5 appointments (look out 90 days)    Jun 30, 2020  9:00 AM CDT  PHYSICAL with LEVAR Snyder CNP  [facility] ([facility]) [address] [place] [address] [id number] [phone]         Refill sent  Megha Lebron RN on 6/17/2020 at 10:49 AM          "

## 2020-06-29 ENCOUNTER — OFFICE VISIT (OUTPATIENT)
Dept: OBGYN | Facility: CLINIC | Age: 25
End: 2020-06-29
Payer: COMMERCIAL

## 2020-06-29 VITALS
HEIGHT: 66 IN | DIASTOLIC BLOOD PRESSURE: 80 MMHG | BODY MASS INDEX: 22.5 KG/M2 | HEART RATE: 80 BPM | SYSTOLIC BLOOD PRESSURE: 112 MMHG | WEIGHT: 140 LBS

## 2020-06-29 DIAGNOSIS — Z30.09 ENCOUNTER FOR OTHER GENERAL COUNSELING OR ADVICE ON CONTRACEPTION: ICD-10-CM

## 2020-06-29 DIAGNOSIS — Z13.1 SCREENING FOR DIABETES MELLITUS: Primary | ICD-10-CM

## 2020-06-29 DIAGNOSIS — Z13.220 SCREENING CHOLESTEROL LEVEL: ICD-10-CM

## 2020-06-29 DIAGNOSIS — R73.01 ELEVATED FASTING GLUCOSE: ICD-10-CM

## 2020-06-29 DIAGNOSIS — Z11.8 SCREENING FOR CHLAMYDIAL DISEASE: ICD-10-CM

## 2020-06-29 DIAGNOSIS — Z12.4 SCREENING FOR CERVICAL CANCER: ICD-10-CM

## 2020-06-29 DIAGNOSIS — Z11.3 SCREEN FOR STD (SEXUALLY TRANSMITTED DISEASE): ICD-10-CM

## 2020-06-29 LAB
CHOLEST SERPL-MCNC: 140 MG/DL
GLUCOSE BLD-MCNC: 101 MG/DL (ref 70–99)
HBA1C MFR BLD: 5.5 % (ref 0–5.6)
HDLC SERPL-MCNC: 85 MG/DL
LDLC SERPL CALC-MCNC: 47 MG/DL
NONHDLC SERPL-MCNC: 55 MG/DL
TRIGL SERPL-MCNC: 39 MG/DL

## 2020-06-29 PROCEDURE — 36415 COLL VENOUS BLD VENIPUNCTURE: CPT | Performed by: NURSE PRACTITIONER

## 2020-06-29 PROCEDURE — 80061 LIPID PANEL: CPT | Performed by: NURSE PRACTITIONER

## 2020-06-29 PROCEDURE — 99395 PREV VISIT EST AGE 18-39: CPT | Performed by: NURSE PRACTITIONER

## 2020-06-29 PROCEDURE — 87491 CHLMYD TRACH DNA AMP PROBE: CPT | Performed by: NURSE PRACTITIONER

## 2020-06-29 PROCEDURE — G0145 SCR C/V CYTO,THINLAYER,RESCR: HCPCS | Performed by: NURSE PRACTITIONER

## 2020-06-29 PROCEDURE — 87591 N.GONORRHOEAE DNA AMP PROB: CPT | Performed by: NURSE PRACTITIONER

## 2020-06-29 PROCEDURE — 83036 HEMOGLOBIN GLYCOSYLATED A1C: CPT | Performed by: NURSE PRACTITIONER

## 2020-06-29 PROCEDURE — 82947 ASSAY GLUCOSE BLOOD QUANT: CPT | Performed by: NURSE PRACTITIONER

## 2020-06-29 RX ORDER — DROSPIRENONE AND ETHINYL ESTRADIOL 0.02-3(28)
1 KIT ORAL DAILY
Qty: 84 TABLET | Refills: 3 | Status: SHIPPED | OUTPATIENT
Start: 2020-06-29 | End: 2020-08-14

## 2020-06-29 SDOH — HEALTH STABILITY: MENTAL HEALTH: HOW OFTEN DO YOU HAVE A DRINK CONTAINING ALCOHOL?: 2-4 TIMES A MONTH

## 2020-06-29 ASSESSMENT — ANXIETY QUESTIONNAIRES
3. WORRYING TOO MUCH ABOUT DIFFERENT THINGS: MORE THAN HALF THE DAYS
IF YOU CHECKED OFF ANY PROBLEMS ON THIS QUESTIONNAIRE, HOW DIFFICULT HAVE THESE PROBLEMS MADE IT FOR YOU TO DO YOUR WORK, TAKE CARE OF THINGS AT HOME, OR GET ALONG WITH OTHER PEOPLE: SOMEWHAT DIFFICULT
7. FEELING AFRAID AS IF SOMETHING AWFUL MIGHT HAPPEN: NOT AT ALL
6. BECOMING EASILY ANNOYED OR IRRITABLE: SEVERAL DAYS
1. FEELING NERVOUS, ANXIOUS, OR ON EDGE: MORE THAN HALF THE DAYS
2. NOT BEING ABLE TO STOP OR CONTROL WORRYING: MORE THAN HALF THE DAYS

## 2020-06-29 ASSESSMENT — MIFFLIN-ST. JEOR: SCORE: 1397.79

## 2020-06-29 ASSESSMENT — PATIENT HEALTH QUESTIONNAIRE - PHQ9
SUM OF ALL RESPONSES TO PHQ QUESTIONS 1-9: 2
5. POOR APPETITE OR OVEREATING: MORE THAN HALF THE DAYS

## 2020-06-29 NOTE — PROGRESS NOTES
Elena is a 24 year old  female who presents for annual exam.     Besides routine health maintenance,  she would like to discuss changing OCP's, getting acne..    HPI: Here for annual exam.  Doing well, but wants to possibly change from cryselle , she has been experiencing acne on chin for last year and other things she has tried liked soaps etc, not helping.  She is due for pap this year, last was 2017.  Fasting for blood work.  She is still living in [place].   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    The patient does not have a PCP    GYNECOLOGIC HISTORY:    Patient's last menstrual period was 2020 (exact date).    Regular menses? yes  Menses every 28 days.  Length of menses: 5 days    Her current contraception method is: oral contraceptives.  She  reports that she has never smoked. She has never used smokeless tobacco.    Patient is sexually active.  STD testing offered?  Accepted  Last PHQ-9 score on record =   PHQ-9 SCORE 2020   PHQ-9 Total Score 2     Last GAD7 score on record =   LARRY-7 SCORE 3/29/2019   Total Score 8     Alcohol Score = 2    HEALTH MAINTENANCE:  Cholesterol: (  Cholesterol   Date Value Ref Range Status   2019 131 <200 mg/dL Final      Last Mammo: Not applicable, Result: Not applicable, Next Mammo  Pap:  Lab Results   Component Value Date    PAP NIL 2017      Colonoscopy:  NA, Result: Not applicable, Next Colonoscopy: 50 years.  Dexa:  NA    Health maintenance updated:  yes    HISTORY:  OB History    Para Term  AB Living   0 0 0 0 0 0   SAB TAB Ectopic Multiple Live Births   0 0 0 0 0       Patient Active Problem List   Diagnosis     Uses oral contraception     Anxiety     Past Surgical History:   Procedure Laterality Date     NO HISTORY OF SURGERY        Social History     Tobacco Use     Smoking status: Never Smoker     Smokeless tobacco: Never Used   Substance Use Topics     Alcohol use: Yes      "Alcohol/week: 0.0 standard drinks     Frequency: 2-4 times a month      Problem (# of Occurrences) Relation (Name,Age of Onset)    Breast Cancer (2) Maternal Grandmother, Paternal Grandmother    Coronary Artery Disease (1) Maternal Grandfather    Diabetes (1) Paternal Grandmother    Hyperlipidemia (6) Maternal Grandmother, Paternal Grandmother, Father, Brother, Sister, Paternal Grandfather    No Known Problems (2) Mother, Other            Current Outpatient Medications   Medication Sig     drospirenone-ethinyl estradiol (SHON) 3-0.02 MG tablet Take 1 tablet by mouth daily     sertraline (ZOLOFT) 50 MG tablet      No current facility-administered medications for this visit.      Allergies   Allergen Reactions     Zithromax [Azithromycin]        Past medical, surgical, social and family histories were reviewed and updated in EPIC.    ROS:   12 point review of systems negative other than symptoms noted below or in the HPI.  Normal menstrual cycles    EXAM:  /80   Pulse 80   Ht 1.67 m (5' 5.75\")   Wt 63.5 kg (140 lb)   LMP 05/29/2020 (Exact Date)   BMI 22.77 kg/m     BMI: Body mass index is 22.77 kg/m .    PHYSICAL EXAM:  Constitutional:   Appearance: Well nourished, well developed, alert, in no acute distress  Neck:  Lymph Nodes:  No lymphadenopathy present    Thyroid:  Gland size normal, nontender, no nodules or masses present  on palpation  Chest:  Respiratory Effort:  Breathing unlabored  Cardiovascular:    Heart: Auscultation:  Regular rate, normal rhythm, no murmurs present  Breasts: Inspection of Breasts:  No lymphadenopathy present., Palpation of Breasts and Axillae:  No masses present on palpation, no breast tenderness., Axillary Lymph Nodes:  No lymphadenopathy present. and No nodularity, asymmetry or nipple discharge bilaterally.  Gastrointestinal:   Abdominal Examination:  Abdomen nontender to palpation, tone normal without rigidity or guarding, no masses present, umbilicus without lesions   Liver " and Spleen:  No hepatomegaly present, liver nontender to palpation    Hernias:  No hernias present  Lymphatic: Lymph Nodes:  No other lymphadenopathy present  Skin:  General Inspection:  No rashes present, no lesions present, no areas of  discoloration  Neurologic:    Mental Status:  Oriented X3.  Normal strength and tone, sensory exam                grossly normal, mentation intact and speech normal.    Psychiatric:   Mentation appears normal and affect normal/bright.         Pelvic Exam:  External Genitalia:     Normal appearance for age, no discharge present, no tenderness present, no inflammatory lesions present, color normal  Vagina:     Normal vaginal vault without central or paravaginal defects, no discharge present, no inflammatory lesions present, no masses present  Bladder:     Nontender to palpation  Urethra:   Urethral Body:  Urethra palpation normal, urethra structural support normal   Urethral Meatus:  No erythema or lesions present  Cervix:     Appearance healthy, no lesions present, nontender to palpation, no bleeding present  Uterus:     Uterus: firm, normal sized and nontender, anteverted in position.   Adnexa:     No adnexal tenderness present, no adnexal masses present  Perineum:     Perineum within normal limits, no evidence of trauma, no rashes or skin lesions present  Anus:     Anus within normal limits, no hemorrhoids present  Inguinal Lymph Nodes:     No lymphadenopathy present  Pubic Hair:     Normal pubic hair distribution for age  Genitalia and Groin:     No rashes present, no lesions present, no areas of discoloration, no masses present      COUNSELING:   Reviewed preventive health counseling, as reflected in patient instructions       Regular exercise       Healthy diet/nutrition       Contraception       Safe sex practices/STD prevention    BMI: Body mass index is 22.77 kg/m .      ASSESSMENT:  24 year old female with satisfactory annual exam.    ICD-10-CM    1. Screening for diabetes  mellitus  Z13.1 Glucose whole blood   2. Screening cholesterol level  Z13.220 Lipid Profile   3. Screening for chlamydial disease  Z11.8 Chlamydia trachomatis PCR   4. Screen for STD (sexually transmitted disease)  Z11.3 Neisseria gonorrhoeae PCR   5. Screening for cervical cancer  Z12.4 Pap imaged thin layer screen only - recommended age 21 - 24 years   6. Encounter for other general counseling or advice on contraception  Z30.09 drospirenone-ethinyl estradiol (SHON) 3-0.02 MG tablet       PLAN:  Normal Gyn exam.  Ok to continue OCP's for 1 year.  Going to change to Shon, will keep us updated on how is working for her and if helps acne.  Pap every 3 years if normal.  Return 1 year for annual exam or prn.    LEVAR Escalante CNP

## 2020-06-29 NOTE — LETTER
July 10, 2020      Elena Meehan  [address]  [place] [address]    Dear ,      I am happy to inform you that your recent cervical cancer screening test (PAP smear) was normal.      Preventative screenings such as this help to ensure your health for years to come. You should repeat a pap smear in 3 years, unless otherwise directed.      You will still need to return to the clinic every year for your annual exam and other preventive tests.     If you have additional questions regarding this result, please call our registered nurse, Yenny at [phone].      Sincerely,      LEVAR Escalante CNP//[facility]

## 2020-07-01 LAB
COPATH REPORT: NORMAL
PAP: NORMAL

## 2020-08-14 ENCOUNTER — TELEPHONE (OUTPATIENT)
Dept: OBGYN | Facility: CLINIC | Age: 25
End: 2020-08-14

## 2020-08-14 DIAGNOSIS — Z30.09 ENCOUNTER FOR OTHER GENERAL COUNSELING OR ADVICE ON CONTRACEPTION: ICD-10-CM

## 2020-08-14 RX ORDER — DROSPIRENONE AND ETHINYL ESTRADIOL 0.02-3(28)
1 KIT ORAL DAILY
Qty: 28 TABLET | Refills: 8 | Status: SHIPPED | OUTPATIENT
Start: 2020-08-14 | End: 2021-01-28

## 2020-08-14 NOTE — TELEPHONE ENCOUNTER
Patient called back - needs to have this changed to 1 pack a month but needs refills through the rest of the year. Insurance will not cover 3 packs.

## 2020-08-14 NOTE — TELEPHONE ENCOUNTER
Informed pt should have a full year of refills available - Rx sent to [facility] 6/29/20 84 tabs with 3 refills.  She will contact pharmacy.  Pt verbalized understanding, in agreement with plan, and voiced no further questions.  Kirti Londono RN on 8/14/2020 at 2:14 PM

## 2020-08-14 NOTE — TELEPHONE ENCOUNTER
Patient is wondering how many refills she has and if she is going to need more. Please give her a call back.    Thank you

## 2020-11-19 ENCOUNTER — TELEPHONE (OUTPATIENT)
Dept: OBGYN | Facility: CLINIC | Age: 25
End: 2020-11-19

## 2020-11-19 NOTE — TELEPHONE ENCOUNTER
Patient calling back wanting to speak with nurse, states she started this birth control in June.     Routing to Anjelica - does this change your response?    Kirti Londono RN on 11/19/2020 at 11:28 AM

## 2020-11-19 NOTE — TELEPHONE ENCOUNTER
I talked with patient and she is on fidelia since June.  She has been normal on cycles till this last month.  This month she started with some brown spotting in last week of active pills, not due to start cycle till Friday.  She denies any cramping or pain.  She states she has been very stressed this month.  We discussed could be the stress, she has not missed any pills and taken on time every day.  Will try one more cycle and see if BTB happens again.  Will call me to update.  HERBIE MillerC

## 2020-11-19 NOTE — TELEPHONE ENCOUNTER
drospirenone-ethinyl estradiol (SHON) 3-0.02 MG tablet (started 8/14/20)    NOT skipping periods - due for period this Friday 11/20  Started last Wednesday experiencing daily brown spotting, not enough to wear a tampon but concerning to her.  Good pill taker, never missed pill    She is concerned that the current dose/rx is right for her  Has had no issues until this started.  Routing to MARY Lyons NP to advise.    Kirti Londono RN on 11/19/2020 at 9:12 AM

## 2020-11-19 NOTE — TELEPHONE ENCOUNTER
This is her first month on these OCP's could be change of hormones.  I would have her do another month and see if it happens again.  HERBIE MillerC

## 2021-01-28 ENCOUNTER — TELEPHONE (OUTPATIENT)
Dept: OBGYN | Facility: CLINIC | Age: 26
End: 2021-01-28

## 2021-01-28 DIAGNOSIS — Z30.09 ENCOUNTER FOR OTHER GENERAL COUNSELING OR ADVICE ON CONTRACEPTION: ICD-10-CM

## 2021-01-28 RX ORDER — DROSPIRENONE AND ETHINYL ESTRADIOL 0.02-3(28)
1 KIT ORAL DAILY
Qty: 84 TABLET | Refills: 1 | Status: SHIPPED | OUTPATIENT
Start: 2021-01-28

## 2021-01-28 NOTE — TELEPHONE ENCOUNTER
Pt states that she is in [place] and is asking for Claudia OCP to be sent to her pharmacy in [place].    Pt lives there, but her parents live in [place] and she visits often.  She plans to continue her GYN care with this clinic and to schedule her annual exam when it is due June 2021.    Reviewed Phone Visit 11/19/2020:  Pt had BTB and was to follow up with Anjelica.    Pt states if she has any BTB that it is very sparse spotting and says the OCP is working well for her.    Will refill to get pt through to her next annual exam.    Coretta Rueda RN on 1/28/2021 at 9:39 AM

## 2021-08-08 ENCOUNTER — HEALTH MAINTENANCE LETTER (OUTPATIENT)
Age: 26
End: 2021-08-08

## 2021-10-04 ENCOUNTER — HEALTH MAINTENANCE LETTER (OUTPATIENT)
Age: 26
End: 2021-10-04

## 2022-09-11 ENCOUNTER — HEALTH MAINTENANCE LETTER (OUTPATIENT)
Age: 27
End: 2022-09-11

## 2023-10-07 ENCOUNTER — HEALTH MAINTENANCE LETTER (OUTPATIENT)
Age: 28
End: 2023-10-07